# Patient Record
Sex: MALE | Race: BLACK OR AFRICAN AMERICAN | NOT HISPANIC OR LATINO | Employment: FULL TIME | URBAN - METROPOLITAN AREA
[De-identification: names, ages, dates, MRNs, and addresses within clinical notes are randomized per-mention and may not be internally consistent; named-entity substitution may affect disease eponyms.]

---

## 2024-09-26 ENCOUNTER — HOSPITAL ENCOUNTER (EMERGENCY)
Facility: HOSPITAL | Age: 39
Discharge: HOME | End: 2024-09-26
Attending: EMERGENCY MEDICINE | Admitting: EMERGENCY MEDICINE

## 2024-09-26 ENCOUNTER — APPOINTMENT (OUTPATIENT)
Dept: RADIOLOGY | Facility: HOSPITAL | Age: 39
End: 2024-09-26

## 2024-09-26 VITALS
SYSTOLIC BLOOD PRESSURE: 141 MMHG | RESPIRATION RATE: 18 BRPM | OXYGEN SATURATION: 97 % | DIASTOLIC BLOOD PRESSURE: 81 MMHG | HEIGHT: 65 IN | WEIGHT: 162 LBS | HEART RATE: 90 BPM | BODY MASS INDEX: 26.99 KG/M2 | TEMPERATURE: 98.1 F
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
## 2024-09-26 PROCEDURE — 90471 IMMUNIZATION ADMIN: CPT

## 2024-09-26 PROCEDURE — 90715 TDAP VACCINE 7 YRS/> IM: CPT

## 2024-09-26 PROCEDURE — 71046 X-RAY EXAM CHEST 2 VIEWS: CPT

## 2024-09-26 PROCEDURE — 250N000011 HC RX IP 250 OP 636

## 2024-09-26 PROCEDURE — 99283 EMERGENCY DEPT VISIT LOW MDM: CPT | Mod: 25 | Performed by: EMERGENCY MEDICINE

## 2024-09-26 PROCEDURE — 250N000013 HC RX MED GY IP 250 OP 250 PS 637

## 2024-09-26 RX ORDER — OXYCODONE HYDROCHLORIDE 5 MG/1
5 TABLET ORAL ONCE
Status: COMPLETED | OUTPATIENT
Start: 2024-09-26 | End: 2024-09-26

## 2024-09-26 RX ORDER — KETOROLAC TROMETHAMINE 15 MG/ML
15 INJECTION, SOLUTION INTRAMUSCULAR; INTRAVENOUS ONCE
Status: COMPLETED | OUTPATIENT
Start: 2024-09-26 | End: 2024-09-26

## 2024-09-26 RX ADMIN — OXYCODONE HYDROCHLORIDE 5 MG: 5 TABLET ORAL at 10:13

## 2024-09-26 RX ADMIN — AMOXICILLIN AND CLAVULANATE POTASSIUM 1 TABLET: 875; 125 TABLET, FILM COATED ORAL at 10:13

## 2024-09-26 RX ADMIN — CLOSTRIDIUM TETANI TOXOID ANTIGEN (FORMALDEHYDE INACTIVATED), CORYNEBACTERIUM DIPHTHERIAE TOXOID ANTIGEN (FORMALDEHYDE INACTIVATED), BORDETELLA PERTUSSIS TOXOID ANTIGEN (GLUTARALDEHYDE INACTIVATED), BORDETELLA PERTUSSIS FILAMENTOUS HEMAGGLUTININ ANTIGEN (FORMALDEHYDE INACTIVATED), BORDETELLA PERTUSSIS PERTACTIN ANTIGEN, AND BORDETELLA PERTUSSIS FIMBRIAE 2/3 ANTIGEN 0.5 ML: 5; 2; 2.5; 5; 3; 5 INJECTION, SUSPENSION INTRAMUSCULAR at 10:15

## 2024-09-26 ASSESSMENT — ACTIVITIES OF DAILY LIVING (ADL)
ADLS_ACUITY_SCORE: 35

## 2024-09-26 ASSESSMENT — COLUMBIA-SUICIDE SEVERITY RATING SCALE - C-SSRS
6. HAVE YOU EVER DONE ANYTHING, STARTED TO DO ANYTHING, OR PREPARED TO DO ANYTHING TO END YOUR LIFE?: NO
1. IN THE PAST MONTH, HAVE YOU WISHED YOU WERE DEAD OR WISHED YOU COULD GO TO SLEEP AND NOT WAKE UP?: NO
2. HAVE YOU ACTUALLY HAD ANY THOUGHTS OF KILLING YOURSELF IN THE PAST MONTH?: NO
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
## 2024-09-26 NOTE — Clinical Note
Catracho Nieto was seen and treated in our emergency department on 9/26/2024.  He may return to work on 09/30/2024.       If you have any questions or concerns, please don't hesitate to call.      Susanne Jauregui PA-C
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
## 2024-09-26 NOTE — DISCHARGE INSTRUCTIONS
As we discussed today, I sent the antibiotic to your pharmacy. Please finish the full course.   Return to the ER for signs of infection in any of the wounds or safety concerns.

## 2024-09-26 NOTE — ED NOTES
Susanne RODRIGUEZ  at bedside, pt reports feeling lightheaded, pt reminded of side effects of narcotics which may include dizziness.

## 2024-09-26 NOTE — ED NOTES
Pt notified of need for ride home if taking pain pills, pt states he will call his brother to provide ride home.
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
## 2025-03-23 ENCOUNTER — APPOINTMENT (OUTPATIENT)
Dept: CT IMAGING | Facility: HOSPITAL | Age: 40
End: 2025-03-23
Attending: EMERGENCY MEDICINE
Payer: COMMERCIAL

## 2025-03-23 ENCOUNTER — HOSPITAL ENCOUNTER (EMERGENCY)
Facility: HOSPITAL | Age: 40
Discharge: HOME | End: 2025-03-24
Attending: EMERGENCY MEDICINE | Admitting: EMERGENCY MEDICINE
Payer: COMMERCIAL

## 2025-03-23 VITALS
SYSTOLIC BLOOD PRESSURE: 123 MMHG | BODY MASS INDEX: 28.32 KG/M2 | RESPIRATION RATE: 20 BRPM | WEIGHT: 170 LBS | DIASTOLIC BLOOD PRESSURE: 65 MMHG | OXYGEN SATURATION: 95 % | TEMPERATURE: 97.5 F | HEIGHT: 65 IN | HEART RATE: 76 BPM

## 2025-03-23 DIAGNOSIS — N30.00 ACUTE CYSTITIS WITHOUT HEMATURIA: ICD-10-CM

## 2025-03-23 LAB
ALBUMIN SERPL BCG-MCNC: 4.6 G/DL (ref 3.5–5.2)
ALBUMIN UR-MCNC: 20 MG/DL
ALP SERPL-CCNC: 107 U/L (ref 40–150)
ALT SERPL W P-5'-P-CCNC: 48 U/L (ref 0–70)
AMORPH CRY #/AREA URNS HPF: ABNORMAL /HPF
ANION GAP SERPL CALCULATED.3IONS-SCNC: 8 MMOL/L (ref 7–15)
APPEARANCE UR: CLEAR
AST SERPL W P-5'-P-CCNC: 26 U/L (ref 0–45)
BASOPHILS # BLD AUTO: 0 10E3/UL (ref 0–0.2)
BASOPHILS NFR BLD AUTO: 0 %
BILIRUB SERPL-MCNC: 0.4 MG/DL
BILIRUB UR QL STRIP: NEGATIVE
BUN SERPL-MCNC: 13.7 MG/DL (ref 6–20)
CALCIUM SERPL-MCNC: 9.3 MG/DL (ref 8.8–10.4)
CHLORIDE SERPL-SCNC: 105 MMOL/L (ref 98–107)
COLOR UR AUTO: YELLOW
CREAT SERPL-MCNC: 0.95 MG/DL (ref 0.67–1.17)
EGFRCR SERPLBLD CKD-EPI 2021: >90 ML/MIN/1.73M2
EOSINOPHIL # BLD AUTO: 0 10E3/UL (ref 0–0.7)
EOSINOPHIL NFR BLD AUTO: 0 %
ERYTHROCYTE [DISTWIDTH] IN BLOOD BY AUTOMATED COUNT: 12.4 % (ref 10–15)
GLUCOSE SERPL-MCNC: 96 MG/DL (ref 70–99)
GLUCOSE UR STRIP-MCNC: NEGATIVE MG/DL
HCO3 SERPL-SCNC: 28 MMOL/L (ref 22–29)
HCT VFR BLD AUTO: 43.5 % (ref 40–53)
HGB BLD-MCNC: 14.7 G/DL (ref 13.3–17.7)
HGB UR QL STRIP: NEGATIVE
HYALINE CASTS: 2 /LPF
IMM GRANULOCYTES # BLD: 0 10E3/UL
IMM GRANULOCYTES NFR BLD: 0 %
KETONES UR STRIP-MCNC: NEGATIVE MG/DL
LEUKOCYTE ESTERASE UR QL STRIP: ABNORMAL
LIPASE SERPL-CCNC: 13 U/L (ref 13–60)
LYMPHOCYTES # BLD AUTO: 1.6 10E3/UL (ref 0.8–5.3)
LYMPHOCYTES NFR BLD AUTO: 15 %
MCH RBC QN AUTO: 29.7 PG (ref 26.5–33)
MCHC RBC AUTO-ENTMCNC: 33.8 G/DL (ref 31.5–36.5)
MCV RBC AUTO: 88 FL (ref 78–100)
MONOCYTES # BLD AUTO: 0.5 10E3/UL (ref 0–1.3)
MONOCYTES NFR BLD AUTO: 5 %
MUCOUS THREADS #/AREA URNS LPF: PRESENT /LPF
NEUTROPHILS # BLD AUTO: 8.6 10E3/UL (ref 1.6–8.3)
NEUTROPHILS NFR BLD AUTO: 80 %
NITRATE UR QL: NEGATIVE
NRBC # BLD AUTO: 0 10E3/UL
NRBC BLD AUTO-RTO: 0 /100
PH UR STRIP: 6 [PH] (ref 5–7)
PLATELET # BLD AUTO: 215 10E3/UL (ref 150–450)
POTASSIUM SERPL-SCNC: 4 MMOL/L (ref 3.4–5.3)
PROT SERPL-MCNC: 7.7 G/DL (ref 6.4–8.3)
RBC # BLD AUTO: 4.95 10E6/UL (ref 4.4–5.9)
RBC URINE: 1 /HPF
SODIUM SERPL-SCNC: 141 MMOL/L (ref 135–145)
SP GR UR STRIP: 1.03 (ref 1–1.03)
UROBILINOGEN UR STRIP-MCNC: <2 MG/DL
WBC # BLD AUTO: 10.8 10E3/UL (ref 4–11)
WBC URINE: 9 /HPF

## 2025-03-23 PROCEDURE — 36415 COLL VENOUS BLD VENIPUNCTURE: CPT | Performed by: EMERGENCY MEDICINE

## 2025-03-23 PROCEDURE — 258N000003 HC RX IP 258 OP 636: Performed by: EMERGENCY MEDICINE

## 2025-03-23 PROCEDURE — 250N000011 HC RX IP 250 OP 636: Performed by: EMERGENCY MEDICINE

## 2025-03-23 PROCEDURE — 96374 THER/PROPH/DIAG INJ IV PUSH: CPT

## 2025-03-23 PROCEDURE — 80053 COMPREHEN METABOLIC PANEL: CPT | Performed by: EMERGENCY MEDICINE

## 2025-03-23 PROCEDURE — 85025 COMPLETE CBC W/AUTO DIFF WBC: CPT | Performed by: EMERGENCY MEDICINE

## 2025-03-23 PROCEDURE — 96361 HYDRATE IV INFUSION ADD-ON: CPT

## 2025-03-23 PROCEDURE — 74176 CT ABD & PELVIS W/O CONTRAST: CPT

## 2025-03-23 PROCEDURE — 83690 ASSAY OF LIPASE: CPT | Performed by: EMERGENCY MEDICINE

## 2025-03-23 PROCEDURE — 99285 EMERGENCY DEPT VISIT HI MDM: CPT | Mod: 25

## 2025-03-23 PROCEDURE — 81001 URINALYSIS AUTO W/SCOPE: CPT | Performed by: EMERGENCY MEDICINE

## 2025-03-23 RX ORDER — CEPHALEXIN 500 MG/1
500 CAPSULE ORAL 4 TIMES DAILY
Qty: 28 CAPSULE | Refills: 0 | Status: SHIPPED | OUTPATIENT
Start: 2025-03-23 | End: 2025-03-30

## 2025-03-23 RX ORDER — ONDANSETRON 2 MG/ML
4 INJECTION INTRAMUSCULAR; INTRAVENOUS ONCE
Status: COMPLETED | OUTPATIENT
Start: 2025-03-23 | End: 2025-03-23

## 2025-03-23 RX ADMIN — ONDANSETRON 4 MG: 2 INJECTION, SOLUTION INTRAMUSCULAR; INTRAVENOUS at 22:36

## 2025-03-23 RX ADMIN — SODIUM CHLORIDE 1000 ML: 0.9 INJECTION, SOLUTION INTRAVENOUS at 22:32

## 2025-03-23 ASSESSMENT — COLUMBIA-SUICIDE SEVERITY RATING SCALE - C-SSRS
2. HAVE YOU ACTUALLY HAD ANY THOUGHTS OF KILLING YOURSELF IN THE PAST MONTH?: NO
6. HAVE YOU EVER DONE ANYTHING, STARTED TO DO ANYTHING, OR PREPARED TO DO ANYTHING TO END YOUR LIFE?: NO
1. IN THE PAST MONTH, HAVE YOU WISHED YOU WERE DEAD OR WISHED YOU COULD GO TO SLEEP AND NOT WAKE UP?: NO

## 2025-03-23 ASSESSMENT — ENCOUNTER SYMPTOMS
DIARRHEA: 1
NAUSEA: 1
ABDOMINAL PAIN: 1
FEVER: 0
VOMITING: 1
FLANK PAIN: 0

## 2025-03-23 ASSESSMENT — ACTIVITIES OF DAILY LIVING (ADL)
ADLS_ACUITY_SCORE: 41
ADLS_ACUITY_SCORE: 45

## 2025-03-24 NOTE — ED TRIAGE NOTES
Patient presents via EMS with c.o intermittent abdominal pain that started last night.  Pt also report burning with urination, voiding in smaller amounts.

## 2025-03-24 NOTE — ED PROVIDER NOTES
EMERGENCY DEPARTMENT ENCOUNTER      NAME: Catracho Nieto  AGE: 39 year old male  YOB: 1985  MRN: [id number]  EVALUATION DATE & TIME: No admission date for patient encounter.    PCP: Leonardo Cardenas Baron    ED PROVIDER: Gamaliel Bailey M.D.      Chief Complaint   Patient presents with    Abdominal Pain         FINAL IMPRESSION:  1.  Acute intermittent abdominal pain.  2.  Acute UTI.    ED COURSE & MEDICAL DECISION MAKIN:43 PM I met with the patient to gather history and to perform my initial exam. We discussed plans for the ED course, including diagnostic testing and treatment. PPE worn: cloth mask.  Diffuse intermittent abdominal pain for 8 hours.  Has not had this before.  He notes associated nausea vomiting times once.  No fever.  11:49 PM.  Findings of urinary tract infection.  CT unremarkable.  The rest the lab work unremarkable.  Results communicated to the patient.  Patient was discharged to home with prescription for cephalexin.  Patient in agreement.    Pertinent Labs & Imaging studies reviewed. (See chart for details)  39 year old male presents to the Emergency Department for evaluation of intermittent abdominal pain.    At the conclusion of the encounter I discussed the results of all of the tests and the disposition. The questions were answered. The patient or family acknowledged understanding and was agreeable with the care plan.              Medical Decision Making  Obtained supplemental history:Supplemental history obtained?: EMS  Reviewed external records: External records reviewed?: Both outpatient and inpatient computer records were reviewed.  Care impacted by chronic illness: GERD  Did you consider but not order tests?: Work up considered but not performed and documented in chart, if applicable  Did you interpret images independently?: Independent interpretation of ECG and images noted in documentation, when applicable.  Consultation discussion with other  provider:Did you involve another provider (consultant, , pharmacy, etc.)?: No  Pending negative evaluation, anticipate discharge home.    MIPS (CTPE, Dental pain, Nelson, Sinusitis, Asthma/COPD, Head Trauma): Not Applicable          MEDICATIONS GIVEN IN THE EMERGENCY:  Medications - No data to display    NEW PRESCRIPTIONS STARTED AT TODAY'S ER VISIT  New Prescriptions    No medications on file          =================================================================    HPI    Patient information was obtained from: patient and EMS    Use of : N/A         Catracho RICK Nieto is a 39 year old male with a pertinent history of gastritis, GERD, who presents to this ED via EMS for evaluation of abdominal pain.    Patient reports at 11 AM today, he started to have persistent intermittent lower abdominal pain. He's never had this before. Currently, he's fasting for Ramadan. He does associate 1 episode of nausea, vomiting, and diarrhea. Currently, the pain is a 3/10. He denies history of abdominal surgeries. He denies any fever or flank pain.    He does not identify any waxing or waning symptoms otherwise, exacerbating or alleviating features, associated symptoms except as mentioned. He denies any pain related complaints.    REVIEW OF SYSTEMS   Review of Systems   Constitutional:  Negative for fever.   Gastrointestinal:  Positive for abdominal pain (lower), diarrhea, nausea and vomiting.   Genitourinary:  Negative for flank pain.   All other systems reviewed and are negative.       PAST MEDICAL HISTORY:  No past medical history on file.    PAST SURGICAL HISTORY:  No past surgical history on file.        CURRENT MEDICATIONS:    No current outpatient medications on file.      ALLERGIES:  No Known Allergies    FAMILY HISTORY:  No family history on file.    SOCIAL HISTORY:   Social History     Socioeconomic History    Marital status:      Social Drivers of Health     Financial Resource Strain: Not At Risk (5/5/2023)  "   Received from [employer]    Financial Resource Strain     Is it hard for you to pay for the very basics like food, housing, medical care or heating?: No   Food Insecurity: No Food Insecurity (2/28/2025)    Received from [employer]    Hunger Vital Sign     Worried About Running Out of Food in the Last Year: Never true     Ran Out of Food in the Last Year: Never true   Transportation Needs: No Transportation Needs (2/28/2025)    [facility]    PRAPARE - Transportation     Lack of Transportation (Medical): No     Lack of Transportation (Non-Medical): No   Housing Stability: Low Risk  (2/28/2025)    Received from [email or web address]    Housing Stability Vital Sign     Unable to Pay for Housing in the Last Year: No     Number of Times Moved in the Last Year: 0     Homeless in the Last Year: No     Denies drugs, alcohol, or tobacco.    VITALS:  /77   Pulse 70   Temp 97.5  F (36.4  C) (Oral)   Resp 18   Ht 1.651 m (5' 5\")   Wt 77.1 kg (170 lb)   SpO2 97%   BMI 28.29 kg/m      PHYSICAL EXAM    Vital Signs:  /77   Pulse 70   Temp 97.5  F (36.4  C) (Oral)   Resp 18   Ht 1.651 m (5' 5\")   Wt 77.1 kg (170 lb)   SpO2 97%   BMI 28.29 kg/m    General:  On entering the room he is in no apparent distress.    Neck:  Neck supple with full range of motion and nontender.    Back:  Back and spine are nontender.  No costovertebral angle tenderness.    HEENT:  Oropharynx clear with moist mucous membranes.  HEENT unremarkable.    Pulmonary:  Chest clear to auscultation without rhonchi rales or wheezing.    Cardiovascular:  Cardiac regular rate and rhythm without murmurs rubs or gallops.    Abdomen:  Abdomen firm, and diffusely mildly tender.  There is no rebound or guarding.    Muskuloskeletal:  He moves all 4 without any difficulty and has normal neurovascular exams.  Extremities without clubbing, cyanosis, or edema.  Legs and calves are nontender.    Neuro:  He is alert and oriented ×3 and " moves all extremities symmetrically.    Psych:  Normal affect.    Skin:  Unremarkable and warm and dry.       LAB:  All pertinent labs reviewed and interpreted.  Labs Ordered and Resulted from Time of ED Arrival to Time of ED Departure   ROUTINE UA WITH MICROSCOPIC REFLEX TO CULTURE - Abnormal       Result Value    Color Urine Yellow      Appearance Urine Clear      Glucose Urine Negative      Bilirubin Urine Negative      Ketones Urine Negative      Specific Gravity Urine 1.030      Blood Urine Negative      pH Urine 6.0      Protein Albumin Urine 20 (*)     Urobilinogen Urine <2.0      Nitrite Urine Negative      Leukocyte Esterase Urine 25 George/uL (*)     Mucus Urine Present (*)     Amorphous Crystals Urine Few (*)     RBC Urine 1      WBC Urine 9 (*)     Hyaline Casts Urine 2     CBC WITH PLATELETS AND DIFFERENTIAL - Abnormal    WBC Count 10.8      RBC Count 4.95      Hemoglobin 14.7      Hematocrit 43.5      MCV 88      MCH 29.7      MCHC 33.8      RDW 12.4      Platelet Count 215      % Neutrophils 80      % Lymphocytes 15      % Monocytes 5      % Eosinophils 0      % Basophils 0      % Immature Granulocytes 0      NRBCs per 100 WBC 0      Absolute Neutrophils 8.6 (*)     Absolute Lymphocytes 1.6      Absolute Monocytes 0.5      Absolute Eosinophils 0.0      Absolute Basophils 0.0      Absolute Immature Granulocytes 0.0      Absolute NRBCs 0.0     COMPREHENSIVE METABOLIC PANEL - Normal    Sodium 141      Potassium 4.0      Carbon Dioxide (CO2) 28      Anion Gap 8      Urea Nitrogen 13.7      Creatinine 0.95      GFR Estimate >90      Calcium 9.3      Chloride 105      Glucose 96      Alkaline Phosphatase 107      AST 26      ALT 48      Protein Total 7.7      Albumin 4.6      Bilirubin Total 0.4     LIPASE - Normal    Lipase 13         RADIOLOGY:  Reviewed all pertinent imaging. Please see official radiology report.  Abd/pelvis CT no contrast - Stone Protocol   Final Result   IMPRESSION:    1.  No acute  findings in the abdomen or pelvis.   2.  No evidence for inguinal or ventral hernia.   3.  Benign-appearing calcified focus in the mesentery of the right lower quadrant. This could represent a calcified lymph node.                       EKG:    None        PROCEDURES:   None      I, Daysi Rodriguez, am serving as a scribe to document services personally performed by Dr. Bailey based on my observation and the provider's statements to me. I, Gamaliel Bailey MD attest that Daysi Rodriguez is acting in a scribe capacity, has observed my performance of the services and has documented them in accordance with my direction.    Gamaliel Bailey M.D.  Emergency Medicine  [facility] EMERGENCY DEPARTMENT  [facility] [facility] [id number] [phone]  Dept: [phone]       Gamaliel Bailey MD  03/23/25 [id number]

## 2025-03-24 NOTE — DISCHARGE INSTRUCTIONS
Encourage fluids.  Tylenol or ibuprofen as needed can help with pain.  Ice or heat can help with pain.  Cephalexin as prescribed for 1 week for urinary tract infection.  See your clinic for follow-up in a week.  See them sooner if problems are worse or concerns.

## 2025-06-05 ENCOUNTER — APPOINTMENT (OUTPATIENT)
Dept: MRI IMAGING | Facility: HOSPITAL | Age: 40
End: 2025-06-05
Attending: EMERGENCY MEDICINE
Payer: COMMERCIAL

## 2025-06-05 ENCOUNTER — HOSPITAL ENCOUNTER (EMERGENCY)
Facility: HOSPITAL | Age: 40
Discharge: HOME | End: 2025-06-05
Attending: EMERGENCY MEDICINE
Payer: COMMERCIAL

## 2025-06-05 VITALS
HEIGHT: 65 IN | DIASTOLIC BLOOD PRESSURE: 98 MMHG | BODY MASS INDEX: 29.99 KG/M2 | RESPIRATION RATE: 18 BRPM | TEMPERATURE: 98.6 F | SYSTOLIC BLOOD PRESSURE: 168 MMHG | OXYGEN SATURATION: 95 % | WEIGHT: 180 LBS | HEART RATE: 78 BPM

## 2025-06-05 DIAGNOSIS — G51.0 BELL'S PALSY: ICD-10-CM

## 2025-06-05 LAB
ANION GAP SERPL CALCULATED.3IONS-SCNC: 10 MMOL/L (ref 7–15)
BASOPHILS # BLD AUTO: 0 10E3/UL (ref 0–0.2)
BASOPHILS NFR BLD AUTO: 1 %
BUN SERPL-MCNC: 12.9 MG/DL (ref 6–20)
CALCIUM SERPL-MCNC: 9.6 MG/DL (ref 8.8–10.4)
CHLORIDE SERPL-SCNC: 105 MMOL/L (ref 98–107)
CREAT SERPL-MCNC: 0.95 MG/DL (ref 0.67–1.17)
CRP SERPL-MCNC: <3 MG/L
EGFRCR SERPLBLD CKD-EPI 2021: >90 ML/MIN/1.73M2
EOSINOPHIL # BLD AUTO: 0 10E3/UL (ref 0–0.7)
EOSINOPHIL NFR BLD AUTO: 0 %
ERYTHROCYTE [DISTWIDTH] IN BLOOD BY AUTOMATED COUNT: 12.2 % (ref 10–15)
ERYTHROCYTE [SEDIMENTATION RATE] IN BLOOD BY WESTERGREN METHOD: 14 MM/HR (ref 0–15)
GLUCOSE BLDC GLUCOMTR-MCNC: 90 MG/DL (ref 70–99)
GLUCOSE SERPL-MCNC: 97 MG/DL (ref 70–99)
HCO3 SERPL-SCNC: 25 MMOL/L (ref 22–29)
HCT VFR BLD AUTO: 44.3 % (ref 40–53)
HGB BLD-MCNC: 15.4 G/DL (ref 13.3–17.7)
IMM GRANULOCYTES # BLD: 0 10E3/UL
IMM GRANULOCYTES NFR BLD: 0 %
LYMPHOCYTES # BLD AUTO: 2 10E3/UL (ref 0.8–5.3)
LYMPHOCYTES NFR BLD AUTO: 54 %
MCH RBC QN AUTO: 29.8 PG (ref 26.5–33)
MCHC RBC AUTO-ENTMCNC: 34.8 G/DL (ref 31.5–36.5)
MCV RBC AUTO: 86 FL (ref 78–100)
MONOCYTES # BLD AUTO: 0.3 10E3/UL (ref 0–1.3)
MONOCYTES NFR BLD AUTO: 8 %
NEUTROPHILS # BLD AUTO: 1.4 10E3/UL (ref 1.6–8.3)
NEUTROPHILS NFR BLD AUTO: 37 %
NRBC # BLD AUTO: 0 10E3/UL
NRBC BLD AUTO-RTO: 0 /100
PLATELET # BLD AUTO: 207 10E3/UL (ref 150–450)
POTASSIUM SERPL-SCNC: 3.9 MMOL/L (ref 3.4–5.3)
RBC # BLD AUTO: 5.16 10E6/UL (ref 4.4–5.9)
SODIUM SERPL-SCNC: 140 MMOL/L (ref 135–145)
WBC # BLD AUTO: 3.7 10E3/UL (ref 4–11)

## 2025-06-05 PROCEDURE — 70544 MR ANGIOGRAPHY HEAD W/O DYE: CPT

## 2025-06-05 PROCEDURE — 36415 COLL VENOUS BLD VENIPUNCTURE: CPT | Performed by: EMERGENCY MEDICINE

## 2025-06-05 PROCEDURE — 70553 MRI BRAIN STEM W/O & W/DYE: CPT

## 2025-06-05 PROCEDURE — 85004 AUTOMATED DIFF WBC COUNT: CPT | Performed by: EMERGENCY MEDICINE

## 2025-06-05 PROCEDURE — 86140 C-REACTIVE PROTEIN: CPT | Performed by: EMERGENCY MEDICINE

## 2025-06-05 PROCEDURE — 82962 GLUCOSE BLOOD TEST: CPT

## 2025-06-05 PROCEDURE — 99285 EMERGENCY DEPT VISIT HI MDM: CPT | Mod: 25

## 2025-06-05 PROCEDURE — 82947 ASSAY GLUCOSE BLOOD QUANT: CPT | Performed by: EMERGENCY MEDICINE

## 2025-06-05 PROCEDURE — 84132 ASSAY OF SERUM POTASSIUM: CPT | Performed by: EMERGENCY MEDICINE

## 2025-06-05 PROCEDURE — A9585 GADOBUTROL INJECTION: HCPCS | Performed by: EMERGENCY MEDICINE

## 2025-06-05 PROCEDURE — 255N000002 HC RX 255 OP 636: Performed by: EMERGENCY MEDICINE

## 2025-06-05 PROCEDURE — 85652 RBC SED RATE AUTOMATED: CPT | Performed by: EMERGENCY MEDICINE

## 2025-06-05 PROCEDURE — 86618 LYME DISEASE ANTIBODY: CPT | Performed by: EMERGENCY MEDICINE

## 2025-06-05 PROCEDURE — 85025 COMPLETE CBC W/AUTO DIFF WBC: CPT | Performed by: EMERGENCY MEDICINE

## 2025-06-05 PROCEDURE — 70549 MR ANGIOGRAPH NECK W/O&W/DYE: CPT

## 2025-06-05 RX ORDER — VALACYCLOVIR HYDROCHLORIDE 500 MG/1
1000 TABLET, FILM COATED ORAL ONCE
Status: COMPLETED | OUTPATIENT
Start: 2025-06-05 | End: 2025-06-05

## 2025-06-05 RX ORDER — PREDNISONE 20 MG/1
60 TABLET ORAL ONCE
Status: COMPLETED | OUTPATIENT
Start: 2025-06-05 | End: 2025-06-05

## 2025-06-05 RX ORDER — GADOBUTROL 604.72 MG/ML
9 INJECTION INTRAVENOUS ONCE
Status: COMPLETED | OUTPATIENT
Start: 2025-06-05 | End: 2025-06-05

## 2025-06-05 RX ORDER — VALACYCLOVIR HYDROCHLORIDE 1 G/1
1000 TABLET, FILM COATED ORAL 3 TIMES DAILY
Qty: 21 TABLET | Refills: 0 | Status: SHIPPED | OUTPATIENT
Start: 2025-06-05 | End: 2025-06-12

## 2025-06-05 RX ORDER — PREDNISONE 20 MG/1
TABLET ORAL
Qty: 9 TABLET | Refills: 0 | Status: SHIPPED | OUTPATIENT
Start: 2025-06-05

## 2025-06-05 RX ADMIN — GADOBUTROL 9 ML: 604.72 INJECTION INTRAVENOUS at 18:36

## 2025-06-05 ASSESSMENT — ACTIVITIES OF DAILY LIVING (ADL): ADLS_ACUITY_SCORE: 41

## 2025-06-05 NOTE — ED PROVIDER NOTES
EMERGENCY DEPARTMENT ENCOUNTER       ED Course & Medical Decision Making     4:35 PM I introduced myself to the patient, obtained relevant past history and performed my initial examination. Plan for care discussed during their ED stay and patient is agreeable.     I saw and examined the patient.  There does seem to be involvement of her frontalis muscle and I am most suspicious that this is Bell's palsy but it did have an odd presentation with the preceding neck pain and I do feel it would be most appropriate to be thorough and do the MRI MRA studies to make sure that there is no central involvement.  He was sent for MR MRA studies which showed no central cause for his symptoms.  Lyme's workup is also sent and pending but he does not have any high risk exposure signs or symptoms and I do not feel that he needs to be empirically treated for this.  I will cover him with some valacyclovir empirically and will start him on steroids.  He is given 60 mg of prednisone here in the emergency department and prescribed a taper.  He will need to follow-up with his regular physician.      Sepsis: Not suspected    Medical Decision Making  Obtained supplemental history: N/A  Reviewed external records: ER visit 3/23/2025  Care impacted by chronic illness: N/A  Care significantly affected by social determinants of health:See MDM  Did you consider but not order tests?:See MDM  Did you interpret images independently?:  Any ordered images or EKGs were reviewed and independently interpreted  Consultation discussion with other provider:See MDM  Admit v. Discharge: See MDM      MIPS: Not Applicable       Prior to making a final disposition on this patient the results of patient's tests and other diagnostic studies were discussed with the patient. All questions were answered. Patient expressed understanding of the plan and was amenable to it.    Medications   valACYclovir (VALTREX) tablet 1,000 mg (has no administration in time range)    predniSONE (DELTASONE) tablet 60 mg (has no administration in time range)   gadobutrol (GADAVIST) injection 9 mL (9 mLs Intravenous $Given 6/5/25 5530)       Final Impression     1. Bell's palsy            Chief Complaint     Chief Complaint   Patient presents with    Facial Droop    Neck Pain       Pt reports neck pain on Tuesday. Sometime yesterday - pt reports the left side of his face went numb and paralyzed. Left eyebrow/mouth doesn't move currently. Pt reports water dripping out of his mouth.   Neuro eval called - Dr Younger assessed.      Triage Assessment (Adult)       Row Name 06/05/25 5466          Triage Assessment    Airway WDL WDL        Respiratory WDL    Respiratory WDL WDL        Skin Circulation/Temperature WDL    Skin Circulation/Temperature WDL WDL        Cardiac WDL    Cardiac WDL WDL        Peripheral/Neurovascular WDL    Peripheral Neurovascular WDL WDL        Cognitive/Neuro/Behavioral WDL    Cognitive/Neuro/Behavioral WDL WDL                         HPI       Catracho RICK Nieto is a 40 year old male with no pertinent history who arrived to the ED by private car for evaluation of facial droop, neck pain.    Patient reports onset neck pain last Tuesday (2 days ago) and left sided facial droop and weakness yesterday (1 day ago). He isn't able to smile and notes his lips involuntary shifts to the right side. Endorses left eye vision changes, describing it as burning and watery. Notes coming back from a recent trip to [place], and denies any rashes or tick bites. No prescribed medications.       I, Watson Samuel am serving as a scribe to document services personally performed by Rishi Younger M.D. based on my observation and the provider's statements to me. IRishi M.D attest that Watson Samuel is acting in a scribe capacity, has observed my performance of the services and has documented them in accordance with my direction.    Past Medical History     No past medical history on  "file.  No past surgical history on file.  No family history on file.        Relevant past medical, surgical, family and social history as documented above, has been reviewed and discussed with patient. No changes or additions, unless otherwise noted in the HPI.    Current Medications     predniSONE (DELTASONE) 20 MG tablet  valACYclovir (VALTREX) 1000 mg tablet        Allergies     No Known Allergies    Review of Systems     Review of Systems     Remainder of systems reviewed, unless noted in HPI all others negative.    Physical Exam     BP (!) 168/98   Pulse 78   Temp 98.6  F (37  C) (Oral)   Resp 18   Ht 1.651 m (5' 5\")   Wt 81.6 kg (180 lb)   SpO2 95%   BMI 29.95 kg/m      Physical Exam  Vitals and nursing note reviewed.   Constitutional:       General: He is not in acute distress.  HENT:      Head: Normocephalic.      Nose: Nose normal.   Eyes:      General: No scleral icterus.     Extraocular Movements: Extraocular movements intact.      Pupils: Pupils are equal, round, and reactive to light.   Cardiovascular:      Rate and Rhythm: Normal rate.   Pulmonary:      Effort: Pulmonary effort is normal.   Musculoskeletal:      Cervical back: Neck supple.   Skin:     Findings: No rash.   Neurological:      Mental Status: He is alert. Mental status is at baseline.      Comments: Left-sided facial droop, involves frontalis muscle.  Subtle left-sided neck swelling without crepitance or erythema or sign of cellulitis   Psychiatric:         Mood and Affect: Mood normal.             Labs & Imaging         Labs Ordered and Resulted from Time of ED Arrival to Time of ED Departure   CBC WITH PLATELETS AND DIFFERENTIAL - Abnormal       Result Value    WBC Count 3.7 (*)     RBC Count 5.16      Hemoglobin 15.4      Hematocrit 44.3      MCV 86      MCH 29.8      MCHC 34.8      RDW 12.2      Platelet Count 207      % Neutrophils 37      % Lymphocytes 54      % Monocytes 8      % Eosinophils 0      % Basophils 1      % " Immature Granulocytes 0      NRBCs per 100 WBC 0      Absolute Neutrophils 1.4 (*)     Absolute Lymphocytes 2.0      Absolute Monocytes 0.3      Absolute Eosinophils 0.0      Absolute Basophils 0.0      Absolute Immature Granulocytes 0.0      Absolute NRBCs 0.0     GLUCOSE BY METER - Normal    GLUCOSE BY METER POCT 90     CRP INFLAMMATION - Normal    CRP Inflammation <3.00     ERYTHROCYTE SEDIMENTATION RATE AUTO - Normal    Erythrocyte Sedimentation Rate 14     BASIC METABOLIC PANEL - Normal    Sodium 140      Potassium 3.9      Chloride 105      Carbon Dioxide (CO2) 25      Anion Gap 10      Urea Nitrogen 12.9      Creatinine 0.95      GFR Estimate >90      Calcium 9.6      Glucose 97     LYME DISEASE TOTAL ANTIBODIES WITH REFLEX TO CONFIRMATION         Results for orders placed or performed during the hospital encounter of 06/05/25   MR Brain w/o & w Contrast    Impression    IMPRESSION:    HEAD MRI:     1.  No acute intracranial pathology  2.  Several small and nonspecific foci of T2 hyperintense signal in the subcortical white matter of the frontal lobes.    HEAD MRA:     1.  No aneurysm, high flow AVM or significant stenosis identified.  2.  Variant [place] anatomy as above.    NECK MRA:    1.  No hemodynamically significant stenosis regarding the major arterial vasculature of the neck.   MRA Angiogram Head w/o Contrast    Impression    IMPRESSION:    HEAD MRI:     1.  No acute intracranial pathology  2.  Several small and nonspecific foci of T2 hyperintense signal in the subcortical white matter of the frontal lobes.    HEAD MRA:     1.  No aneurysm, high flow AVM or significant stenosis identified.  2.  Variant [place] of [place] anatomy as above.    NECK MRA:    1.  No hemodynamically significant stenosis regarding the major arterial vasculature of the neck.   MRA Angiogram Neck w/o & w Contrast    Impression    IMPRESSION:    HEAD MRI:     1.  No acute intracranial pathology  2.  Several small and  nonspecific foci of T2 hyperintense signal in the subcortical white matter of the frontal lobes.    HEAD MRA:     1.  No aneurysm, high flow AVM or significant stenosis identified.  2.  Variant [place] of [place] anatomy as above.    NECK MRA:    1.  No hemodynamically significant stenosis regarding the major arterial vasculature of the neck.   Glucose by meter   Result Value Ref Range    GLUCOSE BY METER POCT 90 70 - 99 mg/dL   Result Value Ref Range    CRP Inflammation <3.00 <5.00 mg/L   Erythrocyte sedimentation rate auto   Result Value Ref Range    Erythrocyte Sedimentation Rate 14 0 - 15 mm/hr   Basic metabolic panel   Result Value Ref Range    Sodium 140 135 - 145 mmol/L    Potassium 3.9 3.4 - 5.3 mmol/L    Chloride 105 98 - 107 mmol/L    Carbon Dioxide (CO2) 25 22 - 29 mmol/L    Anion Gap 10 7 - 15 mmol/L    Urea Nitrogen 12.9 6.0 - 20.0 mg/dL    Creatinine 0.95 0.67 - 1.17 mg/dL    GFR Estimate >90 >60 mL/min/1.73m2    Calcium 9.6 8.8 - 10.4 mg/dL    Glucose 97 70 - 99 mg/dL   CBC with platelets and differential   Result Value Ref Range    WBC Count 3.7 (L) 4.0 - 11.0 10e3/uL    RBC Count 5.16 4.40 - 5.90 10e6/uL    Hemoglobin 15.4 13.3 - 17.7 g/dL    Hematocrit 44.3 40.0 - 53.0 %    MCV 86 78 - 100 fL    MCH 29.8 26.5 - 33.0 pg    MCHC 34.8 31.5 - 36.5 g/dL    RDW 12.2 10.0 - 15.0 %    Platelet Count 207 150 - 450 10e3/uL    % Neutrophils 37 %    % Lymphocytes 54 %    % Monocytes 8 %    % Eosinophils 0 %    % Basophils 1 %    % Immature Granulocytes 0 %    NRBCs per 100 WBC 0 <1 /100    Absolute Neutrophils 1.4 (L) 1.6 - 8.3 10e3/uL    Absolute Lymphocytes 2.0 0.8 - 5.3 10e3/uL    Absolute Monocytes 0.3 0.0 - 1.3 10e3/uL    Absolute Eosinophils 0.0 0.0 - 0.7 10e3/uL    Absolute Basophils 0.0 0.0 - 0.2 10e3/uL    Absolute Immature Granulocytes 0.0 <=0.4 10e3/uL    Absolute NRBCs 0.0 10e3/uL       Rishi Younger MD  Emergency Medicine  [facility] EMERGENCY DEPARTMENT  [address]  [facility] [address]  [phone]  6/5/2025         Rishi Younger MD  06/05/25 1955

## 2025-06-05 NOTE — ED TRIAGE NOTES
Pt reports neck pain on Tuesday. Sometime yesterday - pt reports the left side of his face went numb and paralyzed. Left eyebrow/mouth doesn't move currently. Pt reports water dripping out of his mouth.   Neuro eval called - Dr Younger assessed.      Triage Assessment (Adult)       Row Name 06/05/25 1632          Triage Assessment    Airway WDL WDL        Respiratory WDL    Respiratory WDL WDL        Skin Circulation/Temperature WDL    Skin Circulation/Temperature WDL WDL        Cardiac WDL    Cardiac WDL WDL        Peripheral/Neurovascular WDL    Peripheral Neurovascular WDL WDL        Cognitive/Neuro/Behavioral WDL    Cognitive/Neuro/Behavioral WDL WDL

## 2025-06-06 LAB — B BURGDOR IGG+IGM SER QL: 0.11
